# Patient Record
Sex: MALE | Race: WHITE | HISPANIC OR LATINO | Employment: FULL TIME | URBAN - METROPOLITAN AREA
[De-identification: names, ages, dates, MRNs, and addresses within clinical notes are randomized per-mention and may not be internally consistent; named-entity substitution may affect disease eponyms.]

---

## 2021-04-12 PROBLEM — J38.01 PARESIS OF LEFT VOCAL FOLD: Status: ACTIVE | Noted: 2021-04-12

## 2021-04-12 PROBLEM — J38.3 GLOTTIC INSUFFICIENCY: Status: ACTIVE | Noted: 2021-04-12

## 2021-04-12 PROBLEM — R13.14 PHARYNGOESOPHAGEAL DYSPHAGIA: Status: ACTIVE | Noted: 2021-04-12

## 2021-04-12 PROBLEM — R49.0 DYSPHONIA: Status: ACTIVE | Noted: 2021-04-12

## 2021-04-12 PROBLEM — R06.02 SHORTNESS OF BREATH: Status: ACTIVE | Noted: 2021-04-12

## 2021-04-12 PROBLEM — Z86.16 HISTORY OF COVID-19: Status: ACTIVE | Noted: 2021-04-12

## 2021-04-12 PROBLEM — R49.0 MUSCLE TENSION DYSPHONIA: Status: ACTIVE | Noted: 2021-04-12

## 2021-04-12 PROBLEM — J38.01 PARALYSIS OF RIGHT VOCAL FOLD: Status: ACTIVE | Noted: 2021-04-12

## 2021-04-12 PROBLEM — K21.9 GASTROESOPHAGEAL REFLUX DISEASE: Status: ACTIVE | Noted: 2021-04-12

## 2021-10-14 ENCOUNTER — ANESTHESIA EVENT (OUTPATIENT)
Dept: PERIOP | Facility: HOSPITAL | Age: 56
End: 2021-10-14
Payer: OTHER MISCELLANEOUS

## 2021-10-14 PROBLEM — Q31.8 ARYTENOID ANOMALY: Status: ACTIVE | Noted: 2021-10-14

## 2021-10-15 ENCOUNTER — HOSPITAL ENCOUNTER (OUTPATIENT)
Facility: HOSPITAL | Age: 56
Setting detail: OUTPATIENT SURGERY
Discharge: HOME | End: 2021-10-15
Attending: OTOLARYNGOLOGY | Admitting: OTOLARYNGOLOGY
Payer: OTHER MISCELLANEOUS

## 2021-10-15 ENCOUNTER — ANESTHESIA (OUTPATIENT)
Dept: PERIOP | Facility: HOSPITAL | Age: 56
End: 2021-10-15
Payer: OTHER MISCELLANEOUS

## 2021-10-15 VITALS
SYSTOLIC BLOOD PRESSURE: 125 MMHG | TEMPERATURE: 97.9 F | RESPIRATION RATE: 18 BRPM | HEIGHT: 67 IN | WEIGHT: 209 LBS | DIASTOLIC BLOOD PRESSURE: 80 MMHG | BODY MASS INDEX: 32.8 KG/M2 | OXYGEN SATURATION: 95 % | HEART RATE: 80 BPM

## 2021-10-15 PROBLEM — J98.4 RESTRICTIVE LUNG DISEASE: Status: ACTIVE | Noted: 2020-09-28

## 2021-10-15 PROBLEM — U07.1 DYSPNEA DUE TO COVID-19: Status: ACTIVE | Noted: 2021-02-03

## 2021-10-15 PROBLEM — M48.02 CERVICAL SPINAL STENOSIS: Status: ACTIVE | Noted: 2020-10-15

## 2021-10-15 PROBLEM — E78.2 MIXED HYPERLIPIDEMIA: Status: ACTIVE | Noted: 2020-06-30

## 2021-10-15 PROBLEM — K21.9 CHRONIC GERD: Status: ACTIVE | Noted: 2020-05-27

## 2021-10-15 PROBLEM — R06.00 DYSPNEA DUE TO COVID-19: Status: ACTIVE | Noted: 2021-02-03

## 2021-10-15 PROBLEM — Z86.69 HISTORY OF MIGRAINE HEADACHES: Status: ACTIVE | Noted: 2020-06-02

## 2021-10-15 PROBLEM — I48.0 PAROXYSMAL ATRIAL FIBRILLATION (HCC): Status: ACTIVE | Noted: 2020-04-10

## 2021-10-15 PROCEDURE — 31571 LARYNGOSCOP W/VC INJ + SCOPE: CPT | Performed by: OTOLARYNGOLOGY

## 2021-10-15 DEVICE — IMPLANTABLE DEVICE
Type: IMPLANTABLE DEVICE | Site: THROAT | Status: FUNCTIONAL
Brand: RESTYLANE-L

## 2021-10-15 RX ORDER — LIDOCAINE HYDROCHLORIDE 10 MG/ML
INJECTION, SOLUTION EPIDURAL; INFILTRATION; INTRACAUDAL; PERINEURAL AS NEEDED
Status: DISCONTINUED | OUTPATIENT
Start: 2021-10-15 | End: 2021-10-15

## 2021-10-15 RX ORDER — ONDANSETRON 2 MG/ML
INJECTION INTRAMUSCULAR; INTRAVENOUS AS NEEDED
Status: DISCONTINUED | OUTPATIENT
Start: 2021-10-15 | End: 2021-10-15

## 2021-10-15 RX ORDER — MIDAZOLAM HYDROCHLORIDE 2 MG/2ML
INJECTION, SOLUTION INTRAMUSCULAR; INTRAVENOUS AS NEEDED
Status: DISCONTINUED | OUTPATIENT
Start: 2021-10-15 | End: 2021-10-15

## 2021-10-15 RX ORDER — SODIUM CHLORIDE, SODIUM LACTATE, POTASSIUM CHLORIDE, CALCIUM CHLORIDE 600; 310; 30; 20 MG/100ML; MG/100ML; MG/100ML; MG/100ML
100 INJECTION, SOLUTION INTRAVENOUS CONTINUOUS
Status: DISCONTINUED | OUTPATIENT
Start: 2021-10-15 | End: 2021-10-15 | Stop reason: HOSPADM

## 2021-10-15 RX ORDER — ONDANSETRON 2 MG/ML
4 INJECTION INTRAMUSCULAR; INTRAVENOUS ONCE AS NEEDED
Status: DISCONTINUED | OUTPATIENT
Start: 2021-10-15 | End: 2021-10-15 | Stop reason: HOSPADM

## 2021-10-15 RX ORDER — PROPOFOL 10 MG/ML
INJECTION, EMULSION INTRAVENOUS CONTINUOUS PRN
Status: DISCONTINUED | OUTPATIENT
Start: 2021-10-15 | End: 2021-10-15

## 2021-10-15 RX ORDER — FENTANYL CITRATE/PF 50 MCG/ML
25 SYRINGE (ML) INJECTION
Status: DISCONTINUED | OUTPATIENT
Start: 2021-10-15 | End: 2021-10-15 | Stop reason: HOSPADM

## 2021-10-15 RX ORDER — SODIUM CHLORIDE, SODIUM LACTATE, POTASSIUM CHLORIDE, CALCIUM CHLORIDE 600; 310; 30; 20 MG/100ML; MG/100ML; MG/100ML; MG/100ML
INJECTION, SOLUTION INTRAVENOUS CONTINUOUS PRN
Status: DISCONTINUED | OUTPATIENT
Start: 2021-10-15 | End: 2021-10-15

## 2021-10-15 RX ORDER — DEXAMETHASONE SODIUM PHOSPHATE 10 MG/ML
INJECTION, SOLUTION INTRAMUSCULAR; INTRAVENOUS AS NEEDED
Status: DISCONTINUED | OUTPATIENT
Start: 2021-10-15 | End: 2021-10-15

## 2021-10-15 RX ORDER — ACETAMINOPHEN 325 MG/1
650 TABLET ORAL EVERY 6 HOURS PRN
Status: CANCELLED | OUTPATIENT
Start: 2021-10-15

## 2021-10-15 RX ORDER — ROCURONIUM BROMIDE 10 MG/ML
INJECTION, SOLUTION INTRAVENOUS AS NEEDED
Status: DISCONTINUED | OUTPATIENT
Start: 2021-10-15 | End: 2021-10-15

## 2021-10-15 RX ORDER — FENTANYL CITRATE 50 UG/ML
INJECTION, SOLUTION INTRAMUSCULAR; INTRAVENOUS AS NEEDED
Status: DISCONTINUED | OUTPATIENT
Start: 2021-10-15 | End: 2021-10-15

## 2021-10-15 RX ORDER — PROPOFOL 10 MG/ML
INJECTION, EMULSION INTRAVENOUS AS NEEDED
Status: DISCONTINUED | OUTPATIENT
Start: 2021-10-15 | End: 2021-10-15

## 2021-10-15 RX ORDER — LIDOCAINE HYDROCHLORIDE 20 MG/ML
INJECTION, SOLUTION EPIDURAL; INFILTRATION; INTRACAUDAL; PERINEURAL AS NEEDED
Status: DISCONTINUED | OUTPATIENT
Start: 2021-10-15 | End: 2021-10-15 | Stop reason: HOSPADM

## 2021-10-15 RX ADMIN — PROPOFOL 200 MG: 10 INJECTION, EMULSION INTRAVENOUS at 08:53

## 2021-10-15 RX ADMIN — PROPOFOL 120 MCG/KG/MIN: 10 INJECTION, EMULSION INTRAVENOUS at 08:55

## 2021-10-15 RX ADMIN — REMIFENTANIL HYDROCHLORIDE 0.15 MCG/KG/MIN: 1 INJECTION, POWDER, LYOPHILIZED, FOR SOLUTION INTRAVENOUS at 08:55

## 2021-10-15 RX ADMIN — LIDOCAINE HYDROCHLORIDE 100 MG: 10 INJECTION, SOLUTION EPIDURAL; INFILTRATION; INTRACAUDAL; PERINEURAL at 08:53

## 2021-10-15 RX ADMIN — SODIUM CHLORIDE, SODIUM LACTATE, POTASSIUM CHLORIDE, AND CALCIUM CHLORIDE: .6; .31; .03; .02 INJECTION, SOLUTION INTRAVENOUS at 09:39

## 2021-10-15 RX ADMIN — ROCURONIUM BROMIDE 10 MG: 50 INJECTION, SOLUTION INTRAVENOUS at 09:15

## 2021-10-15 RX ADMIN — DEXAMETHASONE SODIUM PHOSPHATE 10 MG: 10 INJECTION, SOLUTION INTRAMUSCULAR; INTRAVENOUS at 09:34

## 2021-10-15 RX ADMIN — ONDANSETRON 4 MG: 2 INJECTION INTRAMUSCULAR; INTRAVENOUS at 08:42

## 2021-10-15 RX ADMIN — MIDAZOLAM 1 MG: 1 INJECTION INTRAMUSCULAR; INTRAVENOUS at 08:49

## 2021-10-15 RX ADMIN — FENTANYL CITRATE 50 MCG: 50 INJECTION INTRAMUSCULAR; INTRAVENOUS at 08:53

## 2021-10-15 RX ADMIN — MIDAZOLAM 1 MG: 1 INJECTION INTRAMUSCULAR; INTRAVENOUS at 08:42

## 2021-10-15 RX ADMIN — SODIUM CHLORIDE, SODIUM LACTATE, POTASSIUM CHLORIDE, AND CALCIUM CHLORIDE: .6; .31; .03; .02 INJECTION, SOLUTION INTRAVENOUS at 08:45

## 2021-10-15 RX ADMIN — ROCURONIUM BROMIDE 20 MG: 50 INJECTION, SOLUTION INTRAVENOUS at 09:13

## 2021-10-25 PROBLEM — K21.9 CHRONIC GERD: Status: RESOLVED | Noted: 2020-05-27 | Resolved: 2021-10-25

## 2022-07-14 NOTE — H&P (VIEW-ONLY)
Otolaryngology - Head and Neck Surgery  Return Visit      Salomón Bailey is a 61 y o  who returned for evaluation of hoarseness/dyspnea  HPI:    Symptoms:  Taking omeprazole 40 qam, 20 qpm, cimetidine 400 qhs  Gaviscon  Has plans to see GI ([facility]) in the future to assess reflux, EGD, etc    Breathing: still labored with exertion  Has to take a break after 1 flight of stairs  Therapy is on 2nd floor, gets winded  Throat dry at night, feels "rough" in am    Voice: stable  Swallow:    Prior hx:  Restech on pantoprazole 40 qam, famotidine 40 qhs  109 events, 23 pH <5  3/3 cough (pH 5 5-6)  1/1 hoarse  elev Vignesh scores    S/p MDL vf injection 10/15/21  No arytenoid work due to insurance not approving this portion; intraop the right cricoarytenoid joint was observed to be fixed resulting in laryngoscopic findings and dysfunction  Symptoms: stable breathing and voice; Attending PT; has dry throat, irritated throat often incl at night  Reported that he has been taking pantoprazole at night/bedtime since switching to once daily  Completed preop labs 9/8  Dry throat middle of the night     He reported symptoms since COVID-19 dx last March, intubated for some time for this  Voice is raspy since that time  Attended speech therapy with some benefit  Best voice is first thing in AM, worse towards afternoon, soft/more raspy; Overall stable since last year  Breathing - exertional dyspnea; breathing exercises to try to build endurance  PFT twice; with/without inhaler; mild decrease  Swallow - mild discomfort with swallow, feels dry sometimes  No issues when eating foods/liquids  No cough with liquids  He is comfortable with his breathing but he desires better voice    Allergy - takes claritin/flonase    Reflux - Pantoprazole 40mg qam since COVID-19;  Feels reflux if he forgets to take  No breakthrough symptoms  Hx heartburn  Tomato sauce is a trigger, avoids this  Tums PRN    Occasional mucous/throat clear; No globus;  +PND; No cough; +hoarse;  A little sore/dry throat worse at night;    Swallow ok  GI - no evaluations  Dairy - lactose intolerant  Gluten - some bloating, avoids bread/pasta  Water fills him up; some early satiety     Getting 2nd COVID-19 booster soon     Studies:  (reports scanned in Epic)    Barium esophagram  Transient penetration of thins  Residue in piriforms  No aspiration    CT neck with contrast  Neg    MRI brain  Neg    Thyroid u/s  No nodules  Heterogeneous thyroid      Historical Information   Past Medical History:   Diagnosis Date    GERD (gastroesophageal reflux disease)     Hypertension     Kidney stone      Past Surgical History:   Procedure Laterality Date    HERNIA REPAIR      JOINT REPLACEMENT      knee    KNEE ARTHROSCOPY      CT BRONCHOSCOPY,DIAGNOSTIC N/A 10/15/2021    Procedure: Bronchoscopy Rigid;  Surgeon: Leyla Bosch MD;  [facility]: [facility];  Service: ENT    CT LARYNGOSCOPY,DIRECT,SCOPE,INJ CORDS Bilateral 10/15/2021    Procedure: micro direct laryngoscopy, vocal fold injection; jet vent;  Surgeon: Leyla Bosch MD;  Location: [facility];  Service: ENT    ROTATOR CUFF REPAIR       Social History   Social History     Substance and Sexual Activity   Alcohol Use Yes    Comment: weekly     Social History     Substance and Sexual Activity   Drug Use Not Currently     Social History     Tobacco Use   Smoking Status Never Smoker   Smokeless Tobacco Never Used     No family history on file      Meds/Allergies     Current Outpatient Medications on File Prior to Visit   Medication Sig Dispense Refill    amLODIPine-atorvastatin (CADUET) 5-40 MG per tablet Take 1 tablet by mouth daily      aspirin (ECOTRIN LOW STRENGTH) 81 mg EC tablet Take 81 mg by mouth daily      Cholecalciferol 100 MCG (4000 UT) CAPS Take 1 capsule by mouth (Patient not taking: Reported on 4/25/2022 )      cimetidine (TAGAMET) 400 mg tablet Take 1 tablet (400 mg total) by mouth daily at bedtime 30 tablet 11    loratadine (CLARITIN) 10 mg tablet Take 10 mg by mouth daily      losartan (COZAAR) 100 MG tablet Take 25 mg by mouth daily      metoprolol succinate (TOPROL-XL) 25 mg 24 hr tablet Take 25 mg by mouth daily      omeprazole (PriLOSEC) 40 MG capsule Take 1 capsule (40 mg total) by mouth every morning 30 min before breakfast 30 capsule 11     No current facility-administered medications on file prior to visit  No Known Allergies    Physical exam:     Gen: NAD, cooperative, well nourished  Voice: raspy, mild breathy, mild strain; no stridor, but mild air restriction; dec volume; Head: normocephalic, atraumatic  Face: no facial asymmetry  Balance assessment: Gait steady  Nose: External nose without lesions  Nares congested  No pus  No polyps  Nasal septum mildly deviated  Inferior turbinates pink and without hypertrophy  Sinuses: No tenderness to palpation of maxillary and frontal sinuses  Oral cavity: No lesions/masses  Tongue mobile and soft  No abnormality of parotid ducts  Oropharynx: No lesions/masses  mild cobblestoning  Tonsils without ulceration or exudate  Neck: No LAD  No masses  Normal crepitus of thyroid cartilage  Nontender  Salivary glands: Parotids nontender, non-enlarged  Submandibular glands nontender, non-enlarged  Thyroid: WIthout hypertrophy  No palpable nodules  Nontender  Neuro: Alert  CN III-IX, XI-XII grossly intact  Larynx: Inadequate view of the hypopharynx and larynx with indirect laryngoscopy  Pulm: CTAB nonlabored, no stridor  CV: RRR  Extremities warm/perfused  Abd soft/nontender      Procedures  Procedure:     Strobovideolaryngoscopy (93175)    Pre-Operative Diagnosis:    1  Dysphonia   2  Dyspnea  3  Dysphagia  4  Hx intubation from COVID-19 March 2020  5  Right vf immobility, median  6  Right arytenoid prolapse  7  ?right  avulsion, right posterior vf tapers away  8  Left mild dec ABduction  9   Glottic stenosis 2' vf hypomobility and right arytenoid position  10  Glottic insufficiency, soft  11  Very mild reflux laryngitis  12  Mild MTD  13  S/p MDL, vf injection (restylane), eval of arytenoids 10/15/21    Postop diagnosis:  SAME  Better reflux laryngitis, edema  Right arytenoid prolapse partially obstructs glottic airway    Surgeon:   Bettie Keller MD    Anesthesia:       Stroboscope:  Atmos  Flexible Endoscope:  Chip tip   Rigid endoscope:      Operative Details: The procedure was performed in the endoscopy special procedures room  A high resolution video laryngoscope was inserted transnasally and orally and suspended from the video system for magnification and documentation  Stroboscopic light at several frequencies and intensities was used  Voice:mild raspy, mild breathy, mild strain; no stridor, but mild air restriction; dec volume;        Supraglottic Hyperfunction:    present, mild ant/post/lat  Arytenoid Joint Movement:    Absent right, dec left  Dysdiadochokinesis:     Absent  Arytenoids:   Mild-mod erythema, mild edema  Posterior Cobblestoning:  present mild      Right Vocal Fold:  Abduction:    absent  Adduction:    absent  Longitudinal Tension:   dec    Left Vocal Fold:  Abduction:    Mild dec  Adduction:    Mild dec  Longitudinal Tension:   Normal        Strobovideolaryngoscopy with Magnification    Amplitude Symmetry:   Symmetric  Phase Symmetry:    asymmetric  Periodicity:    Regular    Glottic Closure:    Incomplete- soft, incl posterior    Vocal Process Height:    Equal    Amplitude of Vocal Folds:  Right: Normal  Left: Normal    Wave Form of Vocal Folds:  Right: Normal  Left: Normal    Vibratory Function of Vocal Folds:  Right: Normal  Left: Normal    Vocal Fold Color:  Right: Normal  Left: Normal    Masses/Vibratory Margin Irregularities: Mild Finn's edema, prominent left vocal process    Other Lesions: none    The procedure was concluded without complication and the laryngoscope was removed  Reflux Finding Score (RFS)  Subglottic Edema:   0  Ventricular Obliteration:   2     Erythema/Hyperemia:    4  Vocal Fold Edema:   1-2  Diffuse Laryngeal Edema:   2    Posterior Commissure Hypertrophy:   1     Granuloma/Granulation:   0  Thick Endolaryngeal Mucus:  2     Total: 11      Assessment:    Diagnosis ICD-10-CM Associated Orders   1  Arytenoid anomaly  Q31 8    2  Shortness of breath  R06 02    3  Dyspnea due to COVID-19  U07 1     R06 00    4  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    5  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    6  History of COVID-19  Z86 16    7  Laryngeal stenosis  J38 6    8  Cricoarytenoid joint fixation  J38 7    9  Stridor  R06 1    10  Laryngeal edema  J38 4    11  Finn's edema of vocal folds  J38 1    12  Reflux laryngitis  J04 0     K21 9    13  Pharyngoesophageal dysphagia  R13 14    14  Gastroesophageal reflux disease, unspecified whether esophagitis present  K21 9    15  Glottic insufficiency  J38 3    16  Muscle tension dysphonia  R49 0    17  Dysphonia  R49 0        Orders  No orders of the defined types were placed in this encounter  Plan:    Airway visibly adequate but functionally not as good as it needs to be - one flight of stairs max before needing to take a break  -reflux laryngitis  -right arytenoid prolapse     Pulmonary and cardiac evaluations completed    Recommend right partial/medial arytenoidectomy, topical mitoC, steroid injection (Decadron), jet ventilation  Risks discussed include but not limited to hoarseness, scar, need for additional surgery, pain, bleeding, infection, airway obstruction, oral/pharyngeal injury, tongue pain/swelling/numbness      Hold asa 1 week prior    Omeprazole 40/20  (unable to obatin rabeprazole 20 bid)  Cimetidine 400 qhs  Diet/lifestyle modifications  Gaviscon 2-3 times/day    Agree with GI eval eventually for EGD (vs [facility]NE in office) - [facility] vs [facility] ([facility])  If omeprazole inadequate mgt of acid reflux, can consider med such as dexilant if covered by insurance (typically it is not and [facility] cheapest rate is $125/mo)    Consider:  Gastric empty study    He will return to my office postop

## 2022-08-01 PROBLEM — J38.4 LARYNGEAL EDEMA: Status: ACTIVE | Noted: 2022-08-01

## 2022-08-01 PROBLEM — J38.7 CRICOARYTENOID JOINT FIXATION: Status: ACTIVE | Noted: 2022-08-01

## 2022-08-01 PROBLEM — J04.0 REFLUX LARYNGITIS: Status: ACTIVE | Noted: 2022-08-01

## 2022-08-01 PROBLEM — J38.6 LARYNGEAL STENOSIS: Status: ACTIVE | Noted: 2022-08-01

## 2022-08-01 PROBLEM — K21.9 REFLUX LARYNGITIS: Status: ACTIVE | Noted: 2022-08-01

## 2022-08-01 PROBLEM — R06.1 STRIDOR: Status: ACTIVE | Noted: 2022-08-01

## 2022-08-02 NOTE — PRE-PROCEDURE INSTRUCTIONS
Pre-Surgery Instructions:   Medication Instructions    amLODIPine-atorvastatin (CADUET) 5-40 MG per tablet Take day of surgery   aspirin (ECOTRIN LOW STRENGTH) 81 mg EC tablet Instructions provided by MD    Cholecalciferol 100 MCG (4000 UT) CAPS Stop taking    cimetidine (TAGAMET) 400 mg tablet Take night before surgery    fluticasone (FLONASE) 50 mcg/act nasal spray Take day of surgery   loratadine (CLARITIN) 10 mg tablet Hold day of surgery   losartan (COZAAR) 100 MG tablet Hold day of surgery   metoprolol succinate (TOPROL-XL) 25 mg 24 hr tablet Take day of surgery   omeprazole (PriLOSEC) 20 mg delayed release capsule take evening    omeprazole (PriLOSEC) 40 MG capsule Take day of surgery  Have you had / have a sore throat? No  Have you had / have a cough less than 1 week? No  Have you had / have a fever greater than 100 0 - 100  4? No  Are you experiencing any shortness of breath? No    Review with patient via phone medications and showering instructions  Instructed to avoid all ASA and OTC Vit/Supp 1 week prior to surgery and to avoid NSAIDs 3 days prior to surgery per anesthesia instructions  Tylenol ok to take prn  [facility] call with surgery schedule time, nothing eat or drink after midnight  Verbalized understanding

## 2022-08-05 ENCOUNTER — ANESTHESIA (OUTPATIENT)
Dept: PERIOP | Facility: HOSPITAL | Age: 57
End: 2022-08-05
Payer: OTHER MISCELLANEOUS

## 2022-08-05 ENCOUNTER — ANESTHESIA EVENT (OUTPATIENT)
Dept: PERIOP | Facility: HOSPITAL | Age: 57
End: 2022-08-05
Payer: OTHER MISCELLANEOUS

## 2022-08-05 ENCOUNTER — HOSPITAL ENCOUNTER (OUTPATIENT)
Facility: HOSPITAL | Age: 57
Setting detail: OUTPATIENT SURGERY
Discharge: HOME | End: 2022-08-05
Attending: OTOLARYNGOLOGY | Admitting: OTOLARYNGOLOGY
Payer: OTHER MISCELLANEOUS

## 2022-08-05 VITALS
OXYGEN SATURATION: 92 % | SYSTOLIC BLOOD PRESSURE: 110 MMHG | WEIGHT: 214 LBS | HEIGHT: 67 IN | HEART RATE: 75 BPM | TEMPERATURE: 98.2 F | RESPIRATION RATE: 15 BRPM | BODY MASS INDEX: 33.59 KG/M2 | DIASTOLIC BLOOD PRESSURE: 75 MMHG

## 2022-08-05 DIAGNOSIS — J38.6 LARYNGEAL STENOSIS: Primary | ICD-10-CM

## 2022-08-05 PROCEDURE — 31561 LARYNSCOP REMVE CART + SCOP: CPT | Performed by: OTOLARYNGOLOGY

## 2022-08-05 RX ORDER — LIDOCAINE HYDROCHLORIDE 10 MG/ML
INJECTION, SOLUTION EPIDURAL; INFILTRATION; INTRACAUDAL; PERINEURAL AS NEEDED
Status: DISCONTINUED | OUTPATIENT
Start: 2022-08-05 | End: 2022-08-05

## 2022-08-05 RX ORDER — CLINDAMYCIN HYDROCHLORIDE 300 MG/1
300 CAPSULE ORAL 3 TIMES DAILY
Qty: 15 CAPSULE | Refills: 0 | Status: SHIPPED | OUTPATIENT
Start: 2022-08-05 | End: 2022-08-10

## 2022-08-05 RX ORDER — SODIUM CHLORIDE, SODIUM LACTATE, POTASSIUM CHLORIDE, CALCIUM CHLORIDE 600; 310; 30; 20 MG/100ML; MG/100ML; MG/100ML; MG/100ML
125 INJECTION, SOLUTION INTRAVENOUS CONTINUOUS
Status: DISCONTINUED | OUTPATIENT
Start: 2022-08-05 | End: 2022-08-05 | Stop reason: HOSPADM

## 2022-08-05 RX ORDER — PROPOFOL 10 MG/ML
INJECTION, EMULSION INTRAVENOUS CONTINUOUS PRN
Status: DISCONTINUED | OUTPATIENT
Start: 2022-08-05 | End: 2022-08-05

## 2022-08-05 RX ORDER — FENTANYL CITRATE/PF 50 MCG/ML
25 SYRINGE (ML) INJECTION
Status: DISCONTINUED | OUTPATIENT
Start: 2022-08-05 | End: 2022-08-05 | Stop reason: HOSPADM

## 2022-08-05 RX ORDER — GLYCOPYRROLATE 0.2 MG/ML
INJECTION INTRAMUSCULAR; INTRAVENOUS AS NEEDED
Status: DISCONTINUED | OUTPATIENT
Start: 2022-08-05 | End: 2022-08-05

## 2022-08-05 RX ORDER — PREDNISONE 20 MG/1
20 TABLET ORAL DAILY
Qty: 5 TABLET | Refills: 0 | Status: SHIPPED | OUTPATIENT
Start: 2022-08-05 | End: 2022-08-10

## 2022-08-05 RX ORDER — PROPOFOL 10 MG/ML
INJECTION, EMULSION INTRAVENOUS AS NEEDED
Status: DISCONTINUED | OUTPATIENT
Start: 2022-08-05 | End: 2022-08-05

## 2022-08-05 RX ORDER — HYDROCODONE BITARTRATE AND ACETAMINOPHEN 5; 325 MG/1; MG/1
1 TABLET ORAL EVERY 6 HOURS PRN
Status: DISCONTINUED | OUTPATIENT
Start: 2022-08-05 | End: 2022-08-05 | Stop reason: HOSPADM

## 2022-08-05 RX ORDER — HYDROMORPHONE HCL IN WATER/PF 6 MG/30 ML
0.2 PATIENT CONTROLLED ANALGESIA SYRINGE INTRAVENOUS
Status: DISCONTINUED | OUTPATIENT
Start: 2022-08-05 | End: 2022-08-05 | Stop reason: HOSPADM

## 2022-08-05 RX ORDER — MIDAZOLAM HYDROCHLORIDE 2 MG/2ML
INJECTION, SOLUTION INTRAMUSCULAR; INTRAVENOUS AS NEEDED
Status: DISCONTINUED | OUTPATIENT
Start: 2022-08-05 | End: 2022-08-05

## 2022-08-05 RX ORDER — ONDANSETRON 2 MG/ML
INJECTION INTRAMUSCULAR; INTRAVENOUS AS NEEDED
Status: DISCONTINUED | OUTPATIENT
Start: 2022-08-05 | End: 2022-08-05

## 2022-08-05 RX ORDER — ONDANSETRON 2 MG/ML
4 INJECTION INTRAMUSCULAR; INTRAVENOUS ONCE AS NEEDED
Status: DISCONTINUED | OUTPATIENT
Start: 2022-08-05 | End: 2022-08-05 | Stop reason: HOSPADM

## 2022-08-05 RX ORDER — SODIUM CHLORIDE, SODIUM LACTATE, POTASSIUM CHLORIDE, CALCIUM CHLORIDE 600; 310; 30; 20 MG/100ML; MG/100ML; MG/100ML; MG/100ML
INJECTION, SOLUTION INTRAVENOUS CONTINUOUS PRN
Status: DISCONTINUED | OUTPATIENT
Start: 2022-08-05 | End: 2022-08-05

## 2022-08-05 RX ORDER — SODIUM CHLORIDE, SODIUM LACTATE, POTASSIUM CHLORIDE, CALCIUM CHLORIDE 600; 310; 30; 20 MG/100ML; MG/100ML; MG/100ML; MG/100ML
50 INJECTION, SOLUTION INTRAVENOUS CONTINUOUS
Status: DISCONTINUED | OUTPATIENT
Start: 2022-08-05 | End: 2022-08-05 | Stop reason: HOSPADM

## 2022-08-05 RX ORDER — DEXAMETHASONE SODIUM PHOSPHATE 10 MG/ML
INJECTION, SOLUTION INTRAMUSCULAR; INTRAVENOUS AS NEEDED
Status: DISCONTINUED | OUTPATIENT
Start: 2022-08-05 | End: 2022-08-05

## 2022-08-05 RX ORDER — ALBUTEROL SULFATE 2.5 MG/3ML
2.5 SOLUTION RESPIRATORY (INHALATION) ONCE AS NEEDED
Status: DISCONTINUED | OUTPATIENT
Start: 2022-08-05 | End: 2022-08-05 | Stop reason: HOSPADM

## 2022-08-05 RX ORDER — FENTANYL CITRATE 50 UG/ML
INJECTION, SOLUTION INTRAMUSCULAR; INTRAVENOUS AS NEEDED
Status: DISCONTINUED | OUTPATIENT
Start: 2022-08-05 | End: 2022-08-05

## 2022-08-05 RX ORDER — LIDOCAINE HYDROCHLORIDE 20 MG/ML
INJECTION, SOLUTION EPIDURAL; INFILTRATION; INTRACAUDAL; PERINEURAL AS NEEDED
Status: DISCONTINUED | OUTPATIENT
Start: 2022-08-05 | End: 2022-08-05 | Stop reason: HOSPADM

## 2022-08-05 RX ORDER — ROCURONIUM BROMIDE 10 MG/ML
INJECTION, SOLUTION INTRAVENOUS AS NEEDED
Status: DISCONTINUED | OUTPATIENT
Start: 2022-08-05 | End: 2022-08-05

## 2022-08-05 RX ORDER — LIDOCAINE HYDROCHLORIDE 10 MG/ML
0.5 INJECTION, SOLUTION EPIDURAL; INFILTRATION; INTRACAUDAL; PERINEURAL ONCE AS NEEDED
Status: COMPLETED | OUTPATIENT
Start: 2022-08-05 | End: 2022-08-05

## 2022-08-05 RX ORDER — HYDROCODONE BITARTRATE AND ACETAMINOPHEN 5; 325 MG/1; MG/1
1 TABLET ORAL EVERY 6 HOURS PRN
Qty: 6 TABLET | Refills: 0 | Status: SHIPPED | OUTPATIENT
Start: 2022-08-05

## 2022-08-05 RX ADMIN — Medication 25 MCG: at 15:45

## 2022-08-05 RX ADMIN — GLYCOPYRROLATE 0.2 MG: 0.2 INJECTION, SOLUTION INTRAMUSCULAR; INTRAVENOUS at 13:41

## 2022-08-05 RX ADMIN — MIDAZOLAM 2 MG: 1 INJECTION INTRAMUSCULAR; INTRAVENOUS at 13:35

## 2022-08-05 RX ADMIN — FENTANYL CITRATE 25 MCG: 50 INJECTION INTRAMUSCULAR; INTRAVENOUS at 14:01

## 2022-08-05 RX ADMIN — LIDOCAINE HYDROCHLORIDE 0.5 ML: 10 INJECTION, SOLUTION EPIDURAL; INFILTRATION; INTRACAUDAL; PERINEURAL at 12:44

## 2022-08-05 RX ADMIN — PHENYLEPHRINE HYDROCHLORIDE 20 MCG/MIN: 10 INJECTION INTRAVENOUS at 13:45

## 2022-08-05 RX ADMIN — SODIUM CHLORIDE, SODIUM LACTATE, POTASSIUM CHLORIDE, AND CALCIUM CHLORIDE 50 ML/HR: .6; .31; .03; .02 INJECTION, SOLUTION INTRAVENOUS at 15:44

## 2022-08-05 RX ADMIN — DEXAMETHASONE SODIUM PHOSPHATE 10 MG: 10 INJECTION, SOLUTION INTRAMUSCULAR; INTRAVENOUS at 13:52

## 2022-08-05 RX ADMIN — PROPOFOL 50 MG: 10 INJECTION, EMULSION INTRAVENOUS at 14:18

## 2022-08-05 RX ADMIN — SODIUM CHLORIDE, SODIUM LACTATE, POTASSIUM CHLORIDE, AND CALCIUM CHLORIDE: .6; .31; .03; .02 INJECTION, SOLUTION INTRAVENOUS at 13:35

## 2022-08-05 RX ADMIN — PROPOFOL 100 MCG/KG/MIN: 10 INJECTION, EMULSION INTRAVENOUS at 13:45

## 2022-08-05 RX ADMIN — FENTANYL CITRATE 25 MCG: 50 INJECTION INTRAMUSCULAR; INTRAVENOUS at 14:49

## 2022-08-05 RX ADMIN — SUGAMMADEX 200 MG: 100 INJECTION, SOLUTION INTRAVENOUS at 15:07

## 2022-08-05 RX ADMIN — LIDOCAINE HYDROCHLORIDE 50 MG: 10 INJECTION, SOLUTION EPIDURAL; INFILTRATION; INTRACAUDAL; PERINEURAL at 13:41

## 2022-08-05 RX ADMIN — HYDROCODONE BITARTRATE AND ACETAMINOPHEN 1 TABLET: 5; 325 TABLET ORAL at 16:24

## 2022-08-05 RX ADMIN — SODIUM CHLORIDE, SODIUM LACTATE, POTASSIUM CHLORIDE, AND CALCIUM CHLORIDE 125 ML/HR: .6; .31; .03; .02 INJECTION, SOLUTION INTRAVENOUS at 12:43

## 2022-08-05 RX ADMIN — ROCURONIUM BROMIDE 20 MG: 50 INJECTION INTRAVENOUS at 14:28

## 2022-08-05 RX ADMIN — PROPOFOL 50 MG: 10 INJECTION, EMULSION INTRAVENOUS at 14:06

## 2022-08-05 RX ADMIN — ONDANSETRON 4 MG: 2 INJECTION INTRAMUSCULAR; INTRAVENOUS at 13:52

## 2022-08-05 RX ADMIN — FENTANYL CITRATE 25 MCG: 50 INJECTION INTRAMUSCULAR; INTRAVENOUS at 13:52

## 2022-08-05 RX ADMIN — Medication 25 MCG: at 15:40

## 2022-08-05 RX ADMIN — FENTANYL CITRATE 25 MCG: 50 INJECTION INTRAMUSCULAR; INTRAVENOUS at 14:15

## 2022-08-05 RX ADMIN — PROPOFOL 250 MG: 10 INJECTION, EMULSION INTRAVENOUS at 13:41

## 2022-08-05 RX ADMIN — ROCURONIUM BROMIDE 30 MG: 50 INJECTION INTRAVENOUS at 14:06

## 2022-08-05 NOTE — DISCHARGE INSTRUCTIONS
Absolute voice rest x 24 hours  No talking, shouting, whispering during that time  Avoid straining, heavy lifting, cough, throat clearing    If cough, use OTC cough suppressant or hydrocodone medication    Take antibiotic and steroid as prescribed    Resume previous diet    Follow up with Dr Wyatt Patel in 1 week

## 2022-08-05 NOTE — ANESTHESIA POSTPROCEDURE EVALUATION
Post-Op Assessment Note    CV Status:  Stable  Pain Score: 0    Pain management: adequate     Mental Status:  Alert and awake   Hydration Status:  Euvolemic   PONV Controlled:  Controlled   Airway Patency:  Patent      Post Op Vitals Reviewed: Yes      Staff: Anesthesiologist, CRNA         No complications documented      BP   94/56   Temp      Pulse   82   Resp   12   SpO2   95% RA

## 2022-08-05 NOTE — INTERIM OP NOTE
micro direct laryngoscopy, partial arytenoidectomy, topical mitomycin C, steroid injection  Postoperative Note  PATIENT NAME: Spring Samson  : 1965  MRN: [id number]  [facility]    Surgery Date: 2022    Preop Diagnosis:  Dyspnea [R06 00]  Laryngeal stenosis [J38 6]  Paralysis of vocal fold, unilateral [J38 01]    Post-Op Diagnosis Codes:  SAME    Procedure(s) (LRB):  Micro direct laryngoscopy, right partial (medial) arytenoidectomy, topical mitomycin C    Surgeon(s) and Role:     * Shaun Snyder MD - Primary     * Monster Lamas MD - Assisting    Specimens:  * No specimens in log *    Estimated Blood Loss:   minimal    Anesthesia Type:   General/jet    Findings:   Right cricoarytenoid joint fixation, prolapsed right arytenoid reduced with coblator  Topical mitomycin C (0 5mg/mL) applied for 5 min     Complications:   None    SIGNATURE: Shaun Snyder MD   DATE: 2022   TIME: 3:13 PM

## 2022-08-05 NOTE — INTERVAL H&P NOTE
H&P reviewed  After examining the patient I find no changes in the patients condition since the H&P had been written      Vitals:    08/05/22 1139   BP: 129/86   Pulse: 67   Resp: 18   Temp: (!) 97 1 °F (36 2 °C)   SpO2: 97%

## 2022-08-05 NOTE — ANESTHESIA PREPROCEDURE EVALUATION
Procedure:  micro direct laryngoscopy, partial arytenoidectomy, topical mitomycin C, steroid injection (Bilateral Throat)    Relevant Problems   CARDIO   (+) Mixed hyperlipidemia   (+) Paroxysmal atrial fibrillation (HCC)      GI/HEPATIC   (+) Gastroesophageal reflux disease   (+) Pharyngoesophageal dysphagia      NEURO/PSYCH   (+) History of COVID-19   (+) History of migraine headaches      PULMONARY   (+) Dyspnea due to COVID-19   (+) Shortness of breath             Anesthesia Plan  ASA Score- 3     Anesthesia Type- general with ASA Monitors  Additional Monitors:   Airway Plan: ETT  Plan Factors-    Chart reviewed  Patient summary reviewed  Patient is not a current smoker  Patient did not smoke on day of surgery  Induction- intravenous  Postoperative Plan-   Planned trial extubation    Informed Consent- Anesthetic plan and risks discussed with patient  I personally reviewed this patient with the CRNA  Discussed and agreed on the Anesthesia Plan with the CRNA  Daniela Powers

## 2022-08-05 NOTE — OP NOTE
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]    Post-Op Diagnosis Codes:  SAME    Procedure(s) (LRB):  Micro direct laryngoscopy, right partial (medial) arytenoidectomy, topical mitomycin C, jet ventilation    Specimen(s):  * No specimens in log *    Estimated Blood Loss:   Minimal     Drains:  * No LDAs found *    Anesthesia Type:   General    Operative Indications:  Right vf paralysis/cricoarytenoid joint fixation with arytenoid prolapse and resulting dyspnea  Operative Findings:  Right cricoarytenoid joint fixation, prolapsed right arytenoid reduced with coblator  Topical mitomycin C (0 5mg/mL) applied for 5 min     Complications:   None    Procedure and Technique:  After obtaining informed consent, patient was taken to the operating room by the anesthesia team   Patient was placed in the supine position on the operating room table  Time out was performed to confirm patient's name, ID, and procedure  General anesthesia was induced, LMA was maintained, and patient was turned 90 degrees  Eyes were protected with tape, upper teeth/gingiva were protected with tooth guard  The LMA was removed and Ermelinda laryngoscope was advanced to obtain view of the larynx and suspended  Jet ventilation with rigid catheter was initiated; once proper oxygenation and chest rise was confirmed, the jet ventilation tubing was connected to the laryngoscope  Coblator MLW was used to remove the medial portion of the right arytenoid and associated scar tissue  Topical Mitomycin C was applied with pledget for 5 minutes  Size 5 ETT was in place during this portion of the procedure  The ETT and pledget were removed   The area was inspected for patency and hemostasis  Oxymetazoline soaked pledgets were used for hemostasis  The vocal folds were sprayed with 2% lidocaine and excess suctioned  The laryngoscope and tooth guard were then removed, LMA was replaced and connected to the anesthesia circuit, and care of patient was returned to anesthesia for awakening  Patient was then taken to the postoperative anesthesia care unit for recovery       I was present for the entire procedure    Patient Disposition:  PACU       SIGNATURE: Leyla Bosch MD  DATE: August 5, 2022  TIME: 3:16 PM

## 2022-12-19 PROBLEM — J39.8 TRACHEOMALACIA: Status: ACTIVE | Noted: 2022-12-19

## 2022-12-19 PROBLEM — R06.1 STRIDOR: Status: RESOLVED | Noted: 2022-08-01 | Resolved: 2022-12-19

## 2023-09-03 PROBLEM — R06.09 DYSPNEA ON EXERTION: Status: ACTIVE | Noted: 2021-04-12

## 2024-04-29 ENCOUNTER — HOSPITAL ENCOUNTER (OUTPATIENT)
Dept: CT IMAGING | Facility: HOSPITAL | Age: 59
Discharge: HOME | End: 2024-04-29
Attending: OTOLARYNGOLOGY
Payer: OTHER MISCELLANEOUS

## 2024-04-29 DIAGNOSIS — J39.8 TRACHEOMALACIA: ICD-10-CM

## 2024-04-29 DIAGNOSIS — R05.3 CHRONIC COUGH: ICD-10-CM

## 2024-04-29 DIAGNOSIS — R91.8 PULMONARY NODULES: ICD-10-CM

## 2024-04-29 DIAGNOSIS — R06.09 DYSPNEA ON EXERTION: ICD-10-CM

## 2024-04-29 PROCEDURE — 71250 CT THORAX DX C-: CPT

## 2024-09-24 ENCOUNTER — APPOINTMENT (EMERGENCY)
Dept: RADIOLOGY | Facility: HOSPITAL | Age: 59
End: 2024-09-24
Payer: COMMERCIAL

## 2024-09-24 ENCOUNTER — HOSPITAL ENCOUNTER (EMERGENCY)
Facility: HOSPITAL | Age: 59
Discharge: HOME | End: 2024-09-24
Attending: EMERGENCY MEDICINE | Admitting: EMERGENCY MEDICINE
Payer: COMMERCIAL

## 2024-09-24 VITALS
DIASTOLIC BLOOD PRESSURE: 80 MMHG | OXYGEN SATURATION: 97 % | SYSTOLIC BLOOD PRESSURE: 122 MMHG | RESPIRATION RATE: 18 BRPM | TEMPERATURE: 98 F | HEART RATE: 89 BPM

## 2024-09-24 DIAGNOSIS — S69.92XA HAND INJURY, LEFT, INITIAL ENCOUNTER: Primary | ICD-10-CM

## 2024-09-24 PROCEDURE — 99283 EMERGENCY DEPT VISIT LOW MDM: CPT

## 2024-09-24 PROCEDURE — 73130 X-RAY EXAM OF HAND: CPT

## 2024-09-24 PROCEDURE — 99284 EMERGENCY DEPT VISIT MOD MDM: CPT | Performed by: EMERGENCY MEDICINE

## 2024-09-24 RX ORDER — ACETAMINOPHEN 325 MG/1
975 TABLET ORAL ONCE
Status: COMPLETED | OUTPATIENT
Start: 2024-09-24 | End: 2024-09-24

## 2024-09-24 RX ORDER — IBUPROFEN 600 MG/1
600 TABLET, FILM COATED ORAL ONCE
Status: COMPLETED | OUTPATIENT
Start: 2024-09-24 | End: 2024-09-24

## 2024-09-24 RX ORDER — OXYCODONE HYDROCHLORIDE 5 MG/1
5 TABLET ORAL ONCE
Status: COMPLETED | OUTPATIENT
Start: 2024-09-24 | End: 2024-09-24

## 2024-09-24 RX ADMIN — ACETAMINOPHEN 975 MG: 325 TABLET, FILM COATED ORAL at 17:57

## 2024-09-24 RX ADMIN — IBUPROFEN 600 MG: 600 TABLET, FILM COATED ORAL at 17:57

## 2024-09-24 RX ADMIN — OXYCODONE HYDROCHLORIDE 5 MG: 5 TABLET ORAL at 17:56

## 2024-09-24 NOTE — ED PROVIDER NOTES
1. Hand injury, left, initial encounter      ED Disposition       ED Disposition   Discharge    Condition   Stable    Date/Time   Tue Sep 24, 2024  6:18 PM    Comment   Torsten Peña discharge to home/self care.                   Assessment & Plan       Medical Decision Making  59-year-old male history of hypertension on aspirin presenting with left hand injury.  Pain injury with swelling to left hand.  Full active range of motion intact.  Neurovascularly intact.  Plan for symptom management with oral medications and topical ice.  X-rays.  Reassess.    X-rays interpreted by me without significant acute process.  Injury and swelling precautions. Discussed results and recommendations. Advised follow up PCP. Medication recommendations. Given instructions and return precautions. Patient/family at bedside acknowledged understanding of all written and verbal instructions and return precautions. Discharged.     Amount and/or Complexity of Data Reviewed  Radiology: ordered.    Risk  OTC drugs.  Prescription drug management.                     Medications   acetaminophen (TYLENOL) tablet 975 mg (975 mg Oral Given 9/24/24 1757)   ibuprofen (MOTRIN) tablet 600 mg (600 mg Oral Given 9/24/24 1757)   oxyCODONE (ROXICODONE) IR tablet 5 mg (5 mg Oral Given 9/24/24 1756)       History of Present Illness       59-year-old male history of hypertension on aspirin presenting with left hand injury.  Patient reports that he is right-hand dominant.  States that he lost his balance and fell on outstretched left hand earlier today.  Reports pain swelling to his second, third, fourth fingers.  Able to make a fist and extend all fingers.  Denies any neurological changes such as motor or sensory deficits.  Denies head strike LOC.  Denies any other complaints.  Chart reviewed.    Past Medical History:  No date: Arthritis  03/2020: COVID-19  No date: GERD (gastroesophageal reflux disease)  No date: History of transfusion  No date:  Hypertension  No date: Kidney stone  Family History: non-contributory  Social History          Review of Systems   Constitutional:  Negative for appetite change, chills, diaphoresis, fever and unexpected weight change.   HENT:  Negative for congestion and rhinorrhea.    Eyes:  Negative for photophobia and visual disturbance.   Respiratory:  Negative for cough, chest tightness and shortness of breath.    Cardiovascular:  Negative for chest pain, palpitations and leg swelling.   Gastrointestinal:  Negative for abdominal distention, abdominal pain, blood in stool, constipation, diarrhea, nausea and vomiting.   Genitourinary:  Negative for dysuria and hematuria.   Musculoskeletal:  Positive for arthralgias. Negative for back pain, joint swelling, neck pain and neck stiffness.   Skin:  Negative for color change, pallor, rash and wound.   Neurological:  Negative for dizziness, syncope, weakness, light-headedness and headaches.   Psychiatric/Behavioral:  Negative for agitation.    All other systems reviewed and are negative.          Objective     ED Triage Vitals   Temperature Pulse Blood Pressure Respirations SpO2 Patient Position - Orthostatic VS   09/24/24 1731 09/24/24 1731 09/24/24 1731 09/24/24 1731 09/24/24 1731 09/24/24 1731   98 °F (36.7 °C) 89 122/80 18 97 % Sitting      Temp Source Heart Rate Source BP Location FiO2 (%) Pain Score    09/24/24 1731 09/24/24 1731 09/24/24 1731 -- 09/24/24 1756    Oral Monitor Left arm  8        Physical Exam  Vitals and nursing note reviewed.   Constitutional:       General: He is not in acute distress.     Appearance: Normal appearance. He is well-developed. He is not ill-appearing, toxic-appearing or diaphoretic.   HENT:      Head: Normocephalic and atraumatic.      Nose: Nose normal. No congestion or rhinorrhea.      Mouth/Throat:      Mouth: Mucous membranes are moist.      Pharynx: Oropharynx is clear. No oropharyngeal exudate or posterior oropharyngeal erythema.   Eyes:       General: No scleral icterus.        Right eye: No discharge.         Left eye: No discharge.      Extraocular Movements: Extraocular movements intact.      Conjunctiva/sclera: Conjunctivae normal.      Pupils: Pupils are equal, round, and reactive to light.   Neck:      Vascular: No JVD.      Trachea: No tracheal deviation.      Comments: Supple. Normal range of motion.   Cardiovascular:      Rate and Rhythm: Normal rate and regular rhythm.      Heart sounds: Normal heart sounds. No murmur heard.     No friction rub. No gallop.      Comments: Normal rate and regular rhythm  Pulmonary:      Effort: Pulmonary effort is normal. No respiratory distress.      Breath sounds: Normal breath sounds. No stridor. No wheezing or rales.      Comments: Clear to auscultation bilaterally  Chest:      Chest wall: No tenderness.   Abdominal:      General: Bowel sounds are normal. There is no distension.      Palpations: Abdomen is soft.      Tenderness: There is no abdominal tenderness. There is no right CVA tenderness, left CVA tenderness, guarding or rebound.      Comments: Soft, nontender, nondistended.  Normal bowel sounds throughout   Musculoskeletal:         General: Swelling and tenderness present. No deformity or signs of injury. Normal range of motion.      Cervical back: Normal range of motion and neck supple. No rigidity. No muscular tenderness.      Right lower leg: No edema.      Left lower leg: No edema.      Comments: Swelling to left second, third, fourth fingers with diffuse tenderness.  No bruising.  Active range of motion intact including isolated flexion extension of DIPs   Lymphadenopathy:      Cervical: No cervical adenopathy.   Skin:     General: Skin is warm and dry.      Coloration: Skin is not pale.      Findings: No erythema or rash.   Neurological:      General: No focal deficit present.      Mental Status: He is alert. Mental status is at baseline.      Sensory: No sensory deficit.      Motor: No  weakness or abnormal muscle tone.      Coordination: Coordination normal.      Gait: Gait normal.      Comments: Alert.  Strength and sensation grossly intact.  Ambulatory without difficulty at baseline.    Psychiatric:         Behavior: Behavior normal.         Thought Content: Thought content normal.         Labs Reviewed - No data to display  XR hand 3+ views LEFT    (Results Pending)       Procedures    ED Medication and Procedure Management   Prior to Admission Medications   Prescriptions Last Dose Informant Patient Reported? Taking?   Diclofenac Sodium (VOLTAREN) 1 %   Yes No   Sig: APPLY 4GM TO LOWER EXTREMITIES TOPICALLY EVERY 6 HOURS AS NEEDED FOR PAIN AND INFLAMMATION **USE DOSING CARD** (DO NOT EXCEED 16 GRAMS DAILY TO ANY AFFECTED JOINT OF THE LOWER EXTREMITIES. DO NOT EXCEED 8 GRAMS DAILY TO ANY AFFECTED JOINT OF THE UPPER EXTREMITIES. DO NOT EXCEED A TOTAL DOSE OF 32 GRAMS DAILY OVER ALL JOINTS)   Natural Vitamin D-3 125 MCG (5000 UT) TABS   Yes No   Sig: Take 1 tablet by mouth daily   Wegovy 0.5 MG/0.5ML   Yes No   Sig: INJECT 0.5MG UNDER THE SKIN EVERY 7 DAYS   albuterol (PROVENTIL HFA,VENTOLIN HFA) 90 mcg/act inhaler   Yes No   Sig: Inhale 2 puffs every 6 (six) hours as needed   albuterol (PROVENTIL HFA,VENTOLIN HFA) 90 mcg/act inhaler   Yes No   Sig: INHALE 2 PUFFS BY MOUTH EVERY 6 HOURS AS NEEDED FOR BREATHING   amLODIPine (NORVASC) 5 mg tablet   Yes No   Sig: Take 5 mg by mouth   amLODIPine-atorvastatin (CADUET) 5-40 MG per tablet   Yes No   Sig: Take 1 tablet by mouth daily   amoxicillin (AMOXIL) 875 mg tablet   Yes No   Sig: ONE EVERY 12 HOURS. START ONE DAY PRIOR TO PROCEDURE IN AM   aspirin (ECOTRIN LOW STRENGTH) 81 mg EC tablet   Yes No   Sig: Take 81 mg by mouth daily   atorvastatin (LIPITOR) 40 mg tablet   Yes No   Sig: Take 40 mg by mouth daily   chlorhexidine (PERIDEX) 0.12 % solution   Yes No   Sig: SWISH AND SPIT 15 ML TWICE A DAY   cimetidine (TAGAMET) 400 mg tablet   No No   Sig:  TAKE 1 TABLET BY MOUTH DAILY AT BEDTIME   cyanocobalamin (VITAMIN B-12) 500 MCG tablet   Yes No   Sig: Take 500 mcg by mouth daily   fluticasone (FLONASE) 50 mcg/act nasal spray   Yes No   Si sprays into each nostril daily   ibuprofen (MOTRIN) 800 mg tablet   Yes No   Sig: Take 800 mg by mouth every 6 to 8 hours as needed for pain   loratadine (CLARITIN) 10 mg tablet   Yes No   Sig: Take 10 mg by mouth daily   losartan (COZAAR) 100 MG tablet   Yes No   Sig: Take 100 mg by mouth daily   metoprolol succinate (TOPROL-XL) 25 mg 24 hr tablet   Yes No   Sig: Take 25 mg by mouth daily   montelukast (SINGULAIR) 10 mg tablet   Yes No   Sig: Take 10 mg by mouth   olmesartan (BENICAR) 40 mg tablet   Yes No   Sig: Take 40 mg by mouth every morning   omeprazole (PriLOSEC) 40 MG capsule   No No   Sig: Take 1 capsule (40 mg total) by mouth every morning 30 min before breakfast   omeprazole (PriLOSEC) 40 MG capsule   No No   Sig: TAKE 1 CAPSULE BY MOUTH 2 TIMES A DAY 30 MIN BEFORE BREAKFAST AND DINNER   traMADol (ULTRAM) 50 mg tablet   Yes No   Sig: TAKE 1 TABLET EVERY 4 TO 6 HOURS AS NEEDED FOR SEVERE PAIN      Facility-Administered Medications: None     Discharge Medication List as of 2024  6:19 PM        CONTINUE these medications which have NOT CHANGED    Details   !! albuterol (PROVENTIL HFA,VENTOLIN HFA) 90 mcg/act inhaler Inhale 2 puffs every 6 (six) hours as needed, Starting Wed 10/11/2023, Historical Med      !! albuterol (PROVENTIL HFA,VENTOLIN HFA) 90 mcg/act inhaler INHALE 2 PUFFS BY MOUTH EVERY 6 HOURS AS NEEDED FOR BREATHING, Historical Med      amLODIPine (NORVASC) 5 mg tablet Take 5 mg by mouth, Starting Tue 10/18/2022, Until Wed 10/18/2023 at 2359, Historical Med      amLODIPine-atorvastatin (CADUET) 5-40 MG per tablet Take 1 tablet by mouth daily, Historical Med      amoxicillin (AMOXIL) 875 mg tablet ONE EVERY 12 HOURS. START ONE DAY PRIOR TO PROCEDURE IN AM, Historical Med      aspirin (ECOTRIN LOW  STRENGTH) 81 mg EC tablet Take 81 mg by mouth daily, Historical Med      atorvastatin (LIPITOR) 40 mg tablet Take 40 mg by mouth daily, Starting Tue 10/18/2022, Until Wed 10/18/2023, Historical Med      chlorhexidine (PERIDEX) 0.12 % solution SWISH AND SPIT 15 ML TWICE A DAY, Historical Med      cimetidine (TAGAMET) 400 mg tablet TAKE 1 TABLET BY MOUTH DAILY AT BEDTIME, Normal      cyanocobalamin (VITAMIN B-12) 500 MCG tablet Take 500 mcg by mouth daily, Starting Thu 10/12/2023, Until Fri 10/11/2024, Historical Med      Diclofenac Sodium (VOLTAREN) 1 % APPLY 4GM TO LOWER EXTREMITIES TOPICALLY EVERY 6 HOURS AS NEEDED FOR PAIN AND INFLAMMATION **USE DOSING CARD** (DO NOT EXCEED 16 GRAMS DAILY TO ANY AFFECTED JOINT OF THE LOWER EXTREMITIES. DO NOT EXCEED 8 GRAMS DAILY TO ANY AFFECTED JOINT OF THE UPPER EX TREMITIES. DO NOT EXCEED A TOTAL DOSE OF 32 GRAMS DAILY OVER ALL JOINTS), Historical Med      fluticasone (FLONASE) 50 mcg/act nasal spray 2 sprays into each nostril daily, Historical Med      ibuprofen (MOTRIN) 800 mg tablet Take 800 mg by mouth every 6 to 8 hours as needed for pain, Starting Wed 4/10/2024, Historical Med      loratadine (CLARITIN) 10 mg tablet Take 10 mg by mouth daily, Historical Med      losartan (COZAAR) 100 MG tablet Take 100 mg by mouth daily, Historical Med      metoprolol succinate (TOPROL-XL) 25 mg 24 hr tablet Take 25 mg by mouth daily, Historical Med      montelukast (SINGULAIR) 10 mg tablet Take 10 mg by mouth, Starting Mon 7/29/2024, Historical Med      Natural Vitamin D-3 125 MCG (5000 UT) TABS Take 1 tablet by mouth daily, Starting Thu 11/30/2023, Historical Med      olmesartan (BENICAR) 40 mg tablet Take 40 mg by mouth every morning, Starting Wed 11/9/2022, Historical Med      !! omeprazole (PriLOSEC) 40 MG capsule Take 1 capsule (40 mg total) by mouth every morning 30 min before breakfast, Starting Wed 4/20/2022, Normal      !! omeprazole (PriLOSEC) 40 MG capsule TAKE 1 CAPSULE BY  MOUTH 2 TIMES A DAY 30 MIN BEFORE BREAKFAST AND DINNER, Normal      traMADol (ULTRAM) 50 mg tablet TAKE 1 TABLET EVERY 4 TO 6 HOURS AS NEEDED FOR SEVERE PAIN, Historical Med      Wegovy 0.5 MG/0.5ML INJECT 0.5MG UNDER THE SKIN EVERY 7 DAYS, Historical Med       !! - Potential duplicate medications found. Please discuss with provider.        No discharge procedures on file.     Michael Crenshaw MD  09/24/24 [id number]

## 2025-05-22 ENCOUNTER — HOSPITAL ENCOUNTER (OUTPATIENT)
Dept: NUCLEAR MEDICINE | Facility: HOSPITAL | Age: 60
End: 2025-05-22
Attending: OTOLARYNGOLOGY
Payer: OTHER MISCELLANEOUS

## 2025-05-22 DIAGNOSIS — R14.0 BLOATING SYMPTOM: ICD-10-CM

## 2025-05-22 DIAGNOSIS — R68.81 EARLY SATIETY: ICD-10-CM

## 2025-05-22 DIAGNOSIS — K21.9 GASTROESOPHAGEAL REFLUX DISEASE, UNSPECIFIED WHETHER ESOPHAGITIS PRESENT: ICD-10-CM

## 2025-05-22 PROCEDURE — 78264 GASTRIC EMPTYING IMG STUDY: CPT

## 2025-05-22 PROCEDURE — A9541 TC99M SULFUR COLLOID: HCPCS

## (undated) DEVICE — TUBING SUCTION 5MM X 12 FT

## (undated) DEVICE — BETHLEHEM UNIVERSAL OUTPATIENT: Brand: CARDINAL HEALTH

## (undated) DEVICE — SYRINGE 3ML LL

## (undated) DEVICE — ANTI-FOG SOLUTION WITH FOAM PAD: Brand: DEVON

## (undated) DEVICE — PROCISE MLW WAND: Brand: COBLATION